# Patient Record
Sex: MALE | NOT HISPANIC OR LATINO | URBAN - METROPOLITAN AREA
[De-identification: names, ages, dates, MRNs, and addresses within clinical notes are randomized per-mention and may not be internally consistent; named-entity substitution may affect disease eponyms.]

---

## 2023-05-02 ENCOUNTER — LAB (OUTPATIENT)
Dept: LAB | Facility: LAB | Age: 33
End: 2023-05-02
Payer: COMMERCIAL

## 2023-05-02 ENCOUNTER — TELEMEDICINE (OUTPATIENT)
Dept: PRIMARY CARE | Facility: CLINIC | Age: 33
End: 2023-05-02
Payer: COMMERCIAL

## 2023-05-02 DIAGNOSIS — B99.9 RECURRENT INFECTIONS: Primary | ICD-10-CM

## 2023-05-02 DIAGNOSIS — B99.9 RECURRENT INFECTIONS: ICD-10-CM

## 2023-05-02 PROBLEM — F52.8 HYPERSEXUALITY: Status: ACTIVE | Noted: 2023-05-02

## 2023-05-02 PROBLEM — E55.9 VITAMIN D DEFICIENCY: Status: ACTIVE | Noted: 2023-05-02

## 2023-05-02 PROBLEM — J45.990 ASTHMA, EXERCISE INDUCED (HHS-HCC): Status: ACTIVE | Noted: 2023-05-02

## 2023-05-02 PROBLEM — M25.551 RIGHT HIP PAIN: Status: ACTIVE | Noted: 2018-08-27

## 2023-05-02 PROBLEM — F41.8 DEPRESSION WITH ANXIETY: Status: ACTIVE | Noted: 2023-05-02

## 2023-05-02 PROBLEM — F41.8 SITUATIONAL ANXIETY: Status: ACTIVE | Noted: 2023-05-02

## 2023-05-02 PROBLEM — F10.21 ALCOHOL DEPENDENCE IN REMISSION (MULTI): Status: ACTIVE | Noted: 2023-05-02

## 2023-05-02 LAB
BASOPHILS (10*3/UL) IN BLOOD BY AUTOMATED COUNT: 0.03 X10E9/L (ref 0–0.1)
BASOPHILS/100 LEUKOCYTES IN BLOOD BY AUTOMATED COUNT: 0.3 % (ref 0–2)
CALCIDIOL (25 OH VITAMIN D3) (NG/ML) IN SER/PLAS: 44 NG/ML
EOSINOPHILS (10*3/UL) IN BLOOD BY AUTOMATED COUNT: 0.08 X10E9/L (ref 0–0.7)
EOSINOPHILS/100 LEUKOCYTES IN BLOOD BY AUTOMATED COUNT: 0.8 % (ref 0–6)
ERYTHROCYTE DISTRIBUTION WIDTH (RATIO) BY AUTOMATED COUNT: 12.3 % (ref 11.5–14.5)
ERYTHROCYTE MEAN CORPUSCULAR HEMOGLOBIN CONCENTRATION (G/DL) BY AUTOMATED: 32.4 G/DL (ref 32–36)
ERYTHROCYTE MEAN CORPUSCULAR VOLUME (FL) BY AUTOMATED COUNT: 91 FL (ref 80–100)
ERYTHROCYTES (10*6/UL) IN BLOOD BY AUTOMATED COUNT: 5.21 X10E12/L (ref 4.5–5.9)
HEMATOCRIT (%) IN BLOOD BY AUTOMATED COUNT: 47.2 % (ref 41–52)
HEMOGLOBIN (G/DL) IN BLOOD: 15.3 G/DL (ref 13.5–17.5)
IMMATURE GRANULOCYTES/100 LEUKOCYTES IN BLOOD BY AUTOMATED COUNT: 0.3 % (ref 0–0.9)
LEUKOCYTES (10*3/UL) IN BLOOD BY AUTOMATED COUNT: 9.5 X10E9/L (ref 4.4–11.3)
LYMPHOCYTES (10*3/UL) IN BLOOD BY AUTOMATED COUNT: 2.6 X10E9/L (ref 1.2–4.8)
LYMPHOCYTES/100 LEUKOCYTES IN BLOOD BY AUTOMATED COUNT: 27.3 % (ref 13–44)
MONOCYTES (10*3/UL) IN BLOOD BY AUTOMATED COUNT: 0.44 X10E9/L (ref 0.1–1)
MONOCYTES/100 LEUKOCYTES IN BLOOD BY AUTOMATED COUNT: 4.6 % (ref 2–10)
NEUTROPHILS (10*3/UL) IN BLOOD BY AUTOMATED COUNT: 6.33 X10E9/L (ref 1.2–7.7)
NEUTROPHILS/100 LEUKOCYTES IN BLOOD BY AUTOMATED COUNT: 66.7 % (ref 40–80)
NRBC (PER 100 WBCS) BY AUTOMATED COUNT: 0 /100 WBC (ref 0–0)
PLATELETS (10*3/UL) IN BLOOD AUTOMATED COUNT: 302 X10E9/L (ref 150–450)
THYROTROPIN (MIU/L) IN SER/PLAS BY DETECTION LIMIT <= 0.05 MIU/L: 1.71 MIU/L (ref 0.44–3.98)

## 2023-05-02 PROCEDURE — 85025 COMPLETE CBC W/AUTO DIFF WBC: CPT

## 2023-05-02 PROCEDURE — 86317 IMMUNOASSAY INFECTIOUS AGENT: CPT

## 2023-05-02 PROCEDURE — 86665 EPSTEIN-BARR CAPSID VCA: CPT

## 2023-05-02 PROCEDURE — 80053 COMPREHEN METABOLIC PANEL: CPT

## 2023-05-02 PROCEDURE — 86663 EPSTEIN-BARR ANTIBODY: CPT

## 2023-05-02 PROCEDURE — 99213 OFFICE O/P EST LOW 20 MIN: CPT | Performed by: FAMILY MEDICINE

## 2023-05-02 PROCEDURE — 36415 COLL VENOUS BLD VENIPUNCTURE: CPT

## 2023-05-02 PROCEDURE — 87389 HIV-1 AG W/HIV-1&-2 AB AG IA: CPT

## 2023-05-02 PROCEDURE — 86664 EPSTEIN-BARR NUCLEAR ANTIGEN: CPT

## 2023-05-02 PROCEDURE — 82306 VITAMIN D 25 HYDROXY: CPT

## 2023-05-02 PROCEDURE — 84443 ASSAY THYROID STIM HORMONE: CPT

## 2023-05-02 RX ORDER — LEVALBUTEROL TARTRATE 45 UG/1
1 AEROSOL, METERED ORAL
COMMUNITY
Start: 2016-04-26 | End: 2023-08-28 | Stop reason: SDUPTHER

## 2023-05-02 RX ORDER — BUSPIRONE HYDROCHLORIDE 15 MG/1
15 TABLET ORAL 3 TIMES DAILY
COMMUNITY
Start: 2018-07-25 | End: 2023-08-28 | Stop reason: ALTCHOICE

## 2023-05-02 RX ORDER — PAROXETINE HYDROCHLORIDE 40 MG/1
40 TABLET, FILM COATED ORAL
COMMUNITY
End: 2023-08-21

## 2023-05-02 ASSESSMENT — PATIENT HEALTH QUESTIONNAIRE - PHQ9
2. FEELING DOWN, DEPRESSED OR HOPELESS: NOT AT ALL
1. LITTLE INTEREST OR PLEASURE IN DOING THINGS: NOT AT ALL
SUM OF ALL RESPONSES TO PHQ9 QUESTIONS 1 AND 2: 0

## 2023-05-02 NOTE — PROGRESS NOTES
Subjective   Patient ID: Adriel Moore is a 32 y.o. male who presents for Nasal Congestion.    HPI  In early January 2023, Laron developed symptoms of sore throat, nasal congestion, and body aches. Despite a negative home COVID test, the patient sought medical care at a local [facility] clinic, where he was diagnosed with a sinus infection and prescribed antibiotics, resulting in symptom relief.    However, the patient reported experiencing recurrent symptoms every 2 to 3 weeks since the initial presentation, which typically last for 3 to 4 days without treatment. These symptoms include body aches, chills, postnasal drip, and fatigue. The patient denies any fever, nausea, or vomiting but experiences tiredness.    During the most recent episode, which occurred 5 days ago, the patient reported feeling tired and occasionally coughing. The cough is nonproductive, and the patient denies any difficulty breathing, such as wheezing or shortness of breath.    The patient has a history of COVID-19, having been diagnosed with the virus twice, first in Christmas 2020 and then again in August 2022. 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    Review of Systems  All pertinent positive symptoms are included in the history of present illness.    All other systems have been reviewed and are negative and noncontributory to this patient's current ailments.    No Known Allergies    Immunization History   Administered Date(s) Administered    Hep A, Unspecified 06/10/2005    Influenza, Unspecified 01/05/2010, 12/05/2012    Influenza, injectable, MDCK, preservative free, quadrivalent 09/28/2020, 01/28/2023    Influenza, injectable, quadrivalent 01/28/2023    Moderna SARS-CoV-2 Vaccination 02/05/2021, 03/05/2021, 10/29/2021    Tdap 06/29/2021    Typhoid, oral 06/10/2005       Objective   Visit Vitals  Smoking Status  Never       Physical Exam  CONSTITUTIONAL - well nourished, well developed, looks like stated age, in no acute distress, not ill-appearing, and not tired appearing  SKIN - normal skin color and pigmentation  EYES - normal external exam  LUNGS - breathing comfortably, no dyspnea  EXTREMITIES - no deformities noticeable  NEUROLOGICAL - oriented and no focal signs  PSYCHIATRIC - alert, pleasant and cordial, age-appropriate, not anxious or depressed appearing    Assessment/Plan   Problem List Items Addressed This Visit       Recurrent infections - Primary     We talked about obtaining some baseline blood work to determine if your immune system is working properly  If we are not able to find a source, then we certainly can consider immunology referral  We will notify of test results once available and make treatment recommendations accordingly         Relevant Orders    Strep Pneumo IgG Ab 23 Serotypes    HIV 1/2 Antigen/Antibody Screen with Reflex to Confirmation    Comprehensive Metabolic Panel    Vitamin D, Total    Thyroid Stimulating Hormone    Sharyn-Barr Virus Antibody Panel    CBC and Auto Differential

## 2023-05-02 NOTE — ASSESSMENT & PLAN NOTE
We talked about obtaining some baseline blood work to determine if your immune system is working properly  If we are not able to find a source, then we certainly can consider immunology referral  We will notify of test results once available and make treatment recommendations accordingly

## 2023-05-03 LAB
ALANINE AMINOTRANSFERASE (SGPT) (U/L) IN SER/PLAS: 10 U/L (ref 10–52)
ALBUMIN (G/DL) IN SER/PLAS: 4.5 G/DL (ref 3.4–5)
ALKALINE PHOSPHATASE (U/L) IN SER/PLAS: 70 U/L (ref 33–120)
ANION GAP IN SER/PLAS: 13 MMOL/L (ref 10–20)
ASPARTATE AMINOTRANSFERASE (SGOT) (U/L) IN SER/PLAS: 13 U/L (ref 9–39)
BILIRUBIN TOTAL (MG/DL) IN SER/PLAS: 0.4 MG/DL (ref 0–1.2)
CALCIUM (MG/DL) IN SER/PLAS: 9.7 MG/DL (ref 8.6–10.6)
CARBON DIOXIDE, TOTAL (MMOL/L) IN SER/PLAS: 29 MMOL/L (ref 21–32)
CHLORIDE (MMOL/L) IN SER/PLAS: 101 MMOL/L (ref 98–107)
CREATININE (MG/DL) IN SER/PLAS: 1.02 MG/DL (ref 0.5–1.3)
EBV INTERPRETATION: ABNORMAL
EPSTEIN-BARR VCA IGG: POSITIVE
EPSTEIN-BARR VCA IGM: NEGATIVE
EPSTEIN-BARR VIRUS EARLY ANTIGEN ANTIBODY, IGG: NEGATIVE
EPSTIEN-BARR NUCLEAR ANTIGEN AB: POSITIVE
GFR MALE: >90 ML/MIN/1.73M2
GLUCOSE (MG/DL) IN SER/PLAS: 95 MG/DL (ref 74–99)
HIV 1/ 2 AG/AB SCREEN: NONREACTIVE
POTASSIUM (MMOL/L) IN SER/PLAS: 4.9 MMOL/L (ref 3.5–5.3)
PROTEIN TOTAL: 7.6 G/DL (ref 6.4–8.2)
SODIUM (MMOL/L) IN SER/PLAS: 138 MMOL/L (ref 136–145)
UREA NITROGEN (MG/DL) IN SER/PLAS: 13 MG/DL (ref 6–23)

## 2023-05-03 NOTE — RESULT ENCOUNTER NOTE
Sharyn-Barr virus which is the virus linked with mononucleosis indicates previous exposure but not active    Sugar, kidney, liver, electrolytes, complete blood count, thyroid, HIV, vitamin D are all perfect    I am still waiting for the Streptococcus 1 which take several days to come back

## 2023-05-05 LAB
PNEUMO SEROTYPE INTERPRETATION: NORMAL
SEROTYPE 1, VIRC: 0.25 UG/ML
SEROTYPE 10A(34), VIRC: 4.33 UG/ML
SEROTYPE 11A(43), VIRC: 0.41 UG/ML
SEROTYPE 12F, VIRC: 0.15 UG/ML
SEROTYPE 14, VIRC: 0.15 UG/ML
SEROTYPE 15B(54), VIRC: 4.38 UG/ML
SEROTYPE 17F, VIRC: 3.53 UG/ML
SEROTYPE 18C(56), VIRC: 1.82 UG/ML
SEROTYPE 19A(57), VIRC: NORMAL UG/ML
SEROTYPE 19F, VIRC: 0.89 UG/ML
SEROTYPE 2, VIRC: 1.11 UG/ML
SEROTYPE 20, VIRC: 2.6 UG/ML
SEROTYPE 22F, VIRC: 0.84 UG/ML
SEROTYPE 23F, VIRC: 0.69 UG/ML
SEROTYPE 3, VIRC: 0.05 UG/ML
SEROTYPE 33F(70), VIRC: 0.53 UG/ML
SEROTYPE 4, VIRC: 0.12 UG/ML
SEROTYPE 5, VIRC: 0.36 UG/ML
SEROTYPE 6B(26), VIRC: 0.29 UG/ML
SEROTYPE 7F(51), VIRC: 0.58 UG/ML
SEROTYPE 8, VIRC: 5.53 UG/ML
SEROTYPE 9N, VIRC: 0.16 UG/ML
SEROTYPE 9V(68), VIRC: 0.12 UG/ML

## 2023-05-07 NOTE — RESULT ENCOUNTER NOTE
Immune blood test that we did indicates that of the 23 bacteria serotypes that we did your body responded to only 3 of them, so I would highly recommend receiving the Pneumovax 23 and then repeating blood work in a month

## 2023-08-19 DIAGNOSIS — F41.8 OTHER SPECIFIED ANXIETY DISORDERS: ICD-10-CM

## 2023-08-21 RX ORDER — PAROXETINE HYDROCHLORIDE 40 MG/1
40 TABLET, FILM COATED ORAL
Qty: 30 TABLET | Refills: 0 | Status: SHIPPED | OUTPATIENT
Start: 2023-08-21 | End: 2023-08-28 | Stop reason: SDUPTHER

## 2023-08-28 ENCOUNTER — LAB (OUTPATIENT)
Dept: LAB | Facility: LAB | Age: 33
End: 2023-08-28
Payer: COMMERCIAL

## 2023-08-28 ENCOUNTER — OFFICE VISIT (OUTPATIENT)
Dept: PRIMARY CARE | Facility: CLINIC | Age: 33
End: 2023-08-28
Payer: COMMERCIAL

## 2023-08-28 VITALS
WEIGHT: 168 LBS | BODY MASS INDEX: 25.46 KG/M2 | HEIGHT: 68 IN | DIASTOLIC BLOOD PRESSURE: 80 MMHG | HEART RATE: 74 BPM | SYSTOLIC BLOOD PRESSURE: 124 MMHG

## 2023-08-28 DIAGNOSIS — F41.8 DEPRESSION WITH ANXIETY: ICD-10-CM

## 2023-08-28 DIAGNOSIS — Z00.00 ANNUAL PHYSICAL EXAM: Primary | ICD-10-CM

## 2023-08-28 DIAGNOSIS — Z00.00 ANNUAL PHYSICAL EXAM: ICD-10-CM

## 2023-08-28 DIAGNOSIS — Z11.4 ENCOUNTER FOR SCREENING FOR HIV: ICD-10-CM

## 2023-08-28 DIAGNOSIS — Z11.59 NEED FOR HEPATITIS C SCREENING TEST: ICD-10-CM

## 2023-08-28 DIAGNOSIS — J45.990 ASTHMA, EXERCISE INDUCED (HHS-HCC): ICD-10-CM

## 2023-08-28 PROBLEM — F10.21 ALCOHOL DEPENDENCE IN REMISSION (MULTI): Status: RESOLVED | Noted: 2023-05-02 | Resolved: 2023-08-28

## 2023-08-28 PROBLEM — M25.551 RIGHT HIP PAIN: Status: RESOLVED | Noted: 2018-08-27 | Resolved: 2023-08-28

## 2023-08-28 PROBLEM — B99.9 RECURRENT INFECTIONS: Status: RESOLVED | Noted: 2023-05-02 | Resolved: 2023-08-28

## 2023-08-28 PROBLEM — F52.8 HYPERSEXUALITY: Status: RESOLVED | Noted: 2023-05-02 | Resolved: 2023-08-28

## 2023-08-28 LAB
ALANINE AMINOTRANSFERASE (SGPT) (U/L) IN SER/PLAS: 23 U/L (ref 10–52)
ALBUMIN (G/DL) IN SER/PLAS: 4.6 G/DL (ref 3.4–5)
ALKALINE PHOSPHATASE (U/L) IN SER/PLAS: 50 U/L (ref 33–120)
ANION GAP IN SER/PLAS: 12 MMOL/L (ref 10–20)
ASPARTATE AMINOTRANSFERASE (SGOT) (U/L) IN SER/PLAS: 32 U/L (ref 9–39)
BASOPHILS (10*3/UL) IN BLOOD BY AUTOMATED COUNT: 0.04 X10E9/L (ref 0–0.1)
BASOPHILS/100 LEUKOCYTES IN BLOOD BY AUTOMATED COUNT: 0.5 % (ref 0–2)
BILIRUBIN TOTAL (MG/DL) IN SER/PLAS: 0.6 MG/DL (ref 0–1.2)
CALCIUM (MG/DL) IN SER/PLAS: 9.9 MG/DL (ref 8.6–10.3)
CARBON DIOXIDE, TOTAL (MMOL/L) IN SER/PLAS: 29 MMOL/L (ref 21–32)
CHLORIDE (MMOL/L) IN SER/PLAS: 100 MMOL/L (ref 98–107)
CHOLESTEROL (MG/DL) IN SER/PLAS: 161 MG/DL (ref 0–199)
CHOLESTEROL IN HDL (MG/DL) IN SER/PLAS: 64.6 MG/DL
CHOLESTEROL/HDL RATIO: 2.5
CREATININE (MG/DL) IN SER/PLAS: 1.03 MG/DL (ref 0.5–1.3)
EOSINOPHILS (10*3/UL) IN BLOOD BY AUTOMATED COUNT: 0.1 X10E9/L (ref 0–0.7)
EOSINOPHILS/100 LEUKOCYTES IN BLOOD BY AUTOMATED COUNT: 1.3 % (ref 0–6)
ERYTHROCYTE DISTRIBUTION WIDTH (RATIO) BY AUTOMATED COUNT: 13.3 % (ref 11.5–14.5)
ERYTHROCYTE MEAN CORPUSCULAR HEMOGLOBIN CONCENTRATION (G/DL) BY AUTOMATED: 31.4 G/DL (ref 32–36)
ERYTHROCYTE MEAN CORPUSCULAR VOLUME (FL) BY AUTOMATED COUNT: 93 FL (ref 80–100)
ERYTHROCYTES (10*6/UL) IN BLOOD BY AUTOMATED COUNT: 5.26 X10E12/L (ref 4.5–5.9)
GFR MALE: >90 ML/MIN/1.73M2
GLUCOSE (MG/DL) IN SER/PLAS: 103 MG/DL (ref 74–99)
HEMATOCRIT (%) IN BLOOD BY AUTOMATED COUNT: 49.1 % (ref 41–52)
HEMOGLOBIN (G/DL) IN BLOOD: 15.4 G/DL (ref 13.5–17.5)
HEPATITIS C VIRUS AB PRESENCE IN SERUM: NONREACTIVE
HIV 1/ 2 AG/AB SCREEN: NONREACTIVE
IMMATURE GRANULOCYTES/100 LEUKOCYTES IN BLOOD BY AUTOMATED COUNT: 0.4 % (ref 0–0.9)
LDL: 86 MG/DL (ref 0–99)
LEUKOCYTES (10*3/UL) IN BLOOD BY AUTOMATED COUNT: 7.8 X10E9/L (ref 4.4–11.3)
LYMPHOCYTES (10*3/UL) IN BLOOD BY AUTOMATED COUNT: 2.15 X10E9/L (ref 1.2–4.8)
LYMPHOCYTES/100 LEUKOCYTES IN BLOOD BY AUTOMATED COUNT: 27.7 % (ref 13–44)
MONOCYTES (10*3/UL) IN BLOOD BY AUTOMATED COUNT: 0.5 X10E9/L (ref 0.1–1)
MONOCYTES/100 LEUKOCYTES IN BLOOD BY AUTOMATED COUNT: 6.5 % (ref 2–10)
NEUTROPHILS (10*3/UL) IN BLOOD BY AUTOMATED COUNT: 4.93 X10E9/L (ref 1.2–7.7)
NEUTROPHILS/100 LEUKOCYTES IN BLOOD BY AUTOMATED COUNT: 63.6 % (ref 40–80)
NRBC (PER 100 WBCS) BY AUTOMATED COUNT: 0.3 /100 WBC (ref 0–0)
PLATELETS (10*3/UL) IN BLOOD AUTOMATED COUNT: 268 X10E9/L (ref 150–450)
POC APPEARANCE, URINE: CLEAR
POC BILIRUBIN, URINE: NEGATIVE
POC BLOOD, URINE: NEGATIVE
POC COLOR, URINE: YELLOW
POC GLUCOSE, URINE: NEGATIVE MG/DL
POC KETONES, URINE: NEGATIVE MG/DL
POC LEUKOCYTES, URINE: NEGATIVE
POC NITRITE,URINE: NEGATIVE
POC PH, URINE: 7 PH
POC PROTEIN, URINE: NEGATIVE MG/DL
POC SPECIFIC GRAVITY, URINE: 1.01
POC UROBILINOGEN, URINE: 0.2 EU/DL
POTASSIUM (MMOL/L) IN SER/PLAS: 4.7 MMOL/L (ref 3.5–5.3)
PROTEIN TOTAL: 7.6 G/DL (ref 6.4–8.2)
SODIUM (MMOL/L) IN SER/PLAS: 136 MMOL/L (ref 136–145)
THYROTROPIN (MIU/L) IN SER/PLAS BY DETECTION LIMIT <= 0.05 MIU/L: 2.57 MIU/L (ref 0.44–3.98)
TRIGLYCERIDE (MG/DL) IN SER/PLAS: 50 MG/DL (ref 0–149)
UREA NITROGEN (MG/DL) IN SER/PLAS: 18 MG/DL (ref 6–23)
VLDL: 10 MG/DL (ref 0–40)

## 2023-08-28 PROCEDURE — 81002 URINALYSIS NONAUTO W/O SCOPE: CPT | Performed by: FAMILY MEDICINE

## 2023-08-28 PROCEDURE — 85025 COMPLETE CBC W/AUTO DIFF WBC: CPT

## 2023-08-28 PROCEDURE — 36415 COLL VENOUS BLD VENIPUNCTURE: CPT

## 2023-08-28 PROCEDURE — 84443 ASSAY THYROID STIM HORMONE: CPT

## 2023-08-28 PROCEDURE — 93000 ELECTROCARDIOGRAM COMPLETE: CPT | Performed by: FAMILY MEDICINE

## 2023-08-28 PROCEDURE — 80053 COMPREHEN METABOLIC PANEL: CPT

## 2023-08-28 PROCEDURE — 86803 HEPATITIS C AB TEST: CPT

## 2023-08-28 PROCEDURE — 80061 LIPID PANEL: CPT

## 2023-08-28 PROCEDURE — 99395 PREV VISIT EST AGE 18-39: CPT | Performed by: FAMILY MEDICINE

## 2023-08-28 PROCEDURE — 87389 HIV-1 AG W/HIV-1&-2 AB AG IA: CPT

## 2023-08-28 PROCEDURE — 4004F PT TOBACCO SCREEN RCVD TLK: CPT | Performed by: FAMILY MEDICINE

## 2023-08-28 RX ORDER — PAROXETINE HYDROCHLORIDE 40 MG/1
40 TABLET, FILM COATED ORAL
Qty: 90 TABLET | Refills: 1 | Status: SHIPPED | OUTPATIENT
Start: 2023-08-28 | End: 2024-04-01

## 2023-08-28 RX ORDER — LEVALBUTEROL TARTRATE 45 UG/1
1-2 AEROSOL, METERED ORAL EVERY 4 HOURS PRN
Qty: 15 G | Refills: 5 | Status: SHIPPED | OUTPATIENT
Start: 2023-08-28 | End: 2023-11-22 | Stop reason: SDUPTHER

## 2023-08-28 ASSESSMENT — PROMIS GLOBAL HEALTH SCALE
RATE_MENTAL_HEALTH: FAIR
RATE_AVERAGE_FATIGUE: MODERATE
RATE_AVERAGE_PAIN: 2
CARRYOUT_SOCIAL_ACTIVITIES: GOOD
RATE_PHYSICAL_HEALTH: VERY GOOD
CARRYOUT_PHYSICAL_ACTIVITIES: COMPLETELY
RATE_QUALITY_OF_LIFE: GOOD
RATE_GENERAL_HEALTH: GOOD
RATE_SOCIAL_SATISFACTION: VERY GOOD
EMOTIONAL_PROBLEMS: SOMETIMES

## 2023-08-28 ASSESSMENT — PATIENT HEALTH QUESTIONNAIRE - PHQ9
1. LITTLE INTEREST OR PLEASURE IN DOING THINGS: NOT AT ALL
2. FEELING DOWN, DEPRESSED OR HOPELESS: NOT AT ALL
SUM OF ALL RESPONSES TO PHQ9 QUESTIONS 1 AND 2: 0

## 2023-08-28 NOTE — PROGRESS NOTES
Subjective   Reason for Visit: Adriel Moore is an 33 y.o. male here for a Annual Exam.     Past Medical, Surgical, and Family History reviewed and updated in chart.    Reviewed all medications by prescribing practitioner or clinical pharmacist (such as prescriptions, OTCs, herbal therapies and supplements) and documented in the medical record.    HPI  1.  Physical exam.  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  Depression and anxiety.  Currently taking Paxil 40 mg daily, tolerates well, requesting refill    3.  Exercise-induced asthma.  Currently controlled, requesting refill on Xopenex  Lost 20 pounds since January, working on trying to get ready for the [place] marathon in October  He feels fantastic, no breathing difficulties    Review of Systems  All pertinent positive symptoms are included in the history of present illness.    All other systems have been reviewed and are negative and noncontributory to this patient's current ailments.    Current Outpatient Medications   Medication Instructions    levalbuterol (Xopenex HFA) 45 mcg/actuation inhaler 1-2 puffs, inhalation, Every 4 hours PRN    PARoxetine (PAXIL) 40 mg, oral, Daily before breakfast     No Known Allergies  Immunization History   Administered Date(s) Administered    Hep A, Unspecified 06/10/2005    Influenza, Unspecified 01/05/2010, 12/05/2012    Influenza, injectable, MDCK, preservative free, quadrivalent 09/28/2020, 01/28/2023    Influenza, injectable, quadrivalent 01/28/2023    Moderna SARS-CoV-2 Vaccination 02/05/2021, 03/05/2021, 10/29/2021    Tdap vaccine, age 10 years and older (BOOSTRIX) 06/29/2021    Typhoid, oral 06/10/2005     Past Surgical History:   Procedure Laterality Date    OTHER SURGICAL HISTORY  10/06/2017    Prior Surgical Procedure Not Done     No family history on file.    Social History     Tobacco Use  "   Smoking status: Every Day     Types: Cigarettes     Passive exposure: Never    Smokeless tobacco: Never   Substance Use Topics    Alcohol use: Not Currently    Drug use: Never     Objective   Visit Vitals  /80   Pulse 74   Ht 1.727 m (5' 8\")   Wt 76.2 kg (168 lb)   BMI 25.54 kg/m²   Smoking Status Every Day   BSA 1.91 m²      Physical Exam  CONSTITUTIONAL - well nourished, well developed, looks like stated age, in no acute distress, not ill-appearing, and not tired appearing  SKIN - normal skin color and pigmentation, normal skin turgor without rash, lesions, or nodules visualized  HEAD - no trauma, normocephalic  EYES - pupils are equal and reactive to light, extraocular muscles are intact, and normal external exam  ENT - R sided cerumen, unable to visualize TM, left TM intact, no signs of infection, canal patent without wax, uvula midline, normal tongue movement and throat normal, no exudate  NECK - supple without rigidity, no neck mass was observed, no thyromegaly or thyroid nodules  CHEST - clear to auscultation, no wheezing, no crackles and no rales, good effort  CARDIAC - bradycardic rate and regular rhythm, no skipped beats, no murmur  ABDOMEN - no organomegaly, soft, nontender, nondistended, normal bowel sounds, no guarding/rebound/rigidity, negative McBurney sign and negative Carrera sign  EXTREMITIES - no edema, no deformities  NEUROLOGICAL - normal gait, normal balance, normal motor, no ataxia, DTRs 3/4 knees equal and symmetrical; alert, oriented and no focal signs  PSYCHIATRIC - alert, pleasant and cordial, age-appropriate  IMMUNOLOGIC - no cervical lymphadenopathy    Assessment/Plan   Problem List Items Addressed This Visit       Asthma, exercise induced    Current Assessment & Plan     Stable, no changes to medication recommended         Relevant Medications    levalbuterol (Xopenex HFA) 45 mcg/actuation inhaler    Depression with anxiety    Current Assessment & Plan     Stable, no changes to " medication recommended         Relevant Medications    PARoxetine (Paxil) 40 mg tablet    Annual physical exam - Primary    Relevant Orders    Lipid Panel    TSH with reflex to Free T4 if abnormal    Comprehensive Metabolic Panel    CBC and Auto Differential    HIV 1/2 Antigen/Antibody Screen with Reflex to Confirmation    Hepatitis C Antibody    POCT UA (nonautomated) manually resulted (Completed)    ECG 12 lead (Completed)     Other Visit Diagnoses       Encounter for screening for HIV        Relevant Orders    HIV 1/2 Antigen/Antibody Screen with Reflex to Confirmation    Need for hepatitis C screening test        Relevant Orders    Hepatitis C Antibody          Patient Care Team:  Kenny Conley DO as PCP - General (Family Medicine)  Kenny Conley DO as PCP - Bakari ALDRIDGE PCP

## 2023-08-28 NOTE — RESULT ENCOUNTER NOTE
Glucose 103 but it has been as high as 110 in the past so stable    Electrolytes, kidney, liver, complete blood cell count, hepatitis C, HIV, thyroid, cholesterol look excellent across-the-board    Congratulations, keep up the great work, good luck at the [place] marathon

## 2023-11-22 ENCOUNTER — OFFICE VISIT (OUTPATIENT)
Dept: PRIMARY CARE | Facility: CLINIC | Age: 33
End: 2023-11-22
Payer: COMMERCIAL

## 2023-11-22 ENCOUNTER — LAB (OUTPATIENT)
Dept: LAB | Facility: LAB | Age: 33
End: 2023-11-22
Payer: COMMERCIAL

## 2023-11-22 VITALS
DIASTOLIC BLOOD PRESSURE: 72 MMHG | HEART RATE: 70 BPM | BODY MASS INDEX: 26.76 KG/M2 | WEIGHT: 176 LBS | SYSTOLIC BLOOD PRESSURE: 120 MMHG

## 2023-11-22 DIAGNOSIS — J45.990 ASTHMA, EXERCISE INDUCED (HHS-HCC): Primary | ICD-10-CM

## 2023-11-22 DIAGNOSIS — Z20.2 POSSIBLE EXPOSURE TO STD: ICD-10-CM

## 2023-11-22 DIAGNOSIS — Z23 INFLUENZA VACCINE NEEDED: ICD-10-CM

## 2023-11-22 DIAGNOSIS — F41.8 SITUATIONAL ANXIETY: ICD-10-CM

## 2023-11-22 PROCEDURE — 87661 TRICHOMONAS VAGINALIS AMPLIF: CPT

## 2023-11-22 PROCEDURE — 36415 COLL VENOUS BLD VENIPUNCTURE: CPT

## 2023-11-22 PROCEDURE — 4004F PT TOBACCO SCREEN RCVD TLK: CPT | Performed by: FAMILY MEDICINE

## 2023-11-22 PROCEDURE — 90471 IMMUNIZATION ADMIN: CPT | Performed by: FAMILY MEDICINE

## 2023-11-22 PROCEDURE — 87389 HIV-1 AG W/HIV-1&-2 AB AG IA: CPT

## 2023-11-22 PROCEDURE — 87800 DETECT AGNT MULT DNA DIREC: CPT

## 2023-11-22 PROCEDURE — 99214 OFFICE O/P EST MOD 30 MIN: CPT | Performed by: FAMILY MEDICINE

## 2023-11-22 PROCEDURE — 86780 TREPONEMA PALLIDUM: CPT

## 2023-11-22 PROCEDURE — 90686 IIV4 VACC NO PRSV 0.5 ML IM: CPT | Performed by: FAMILY MEDICINE

## 2023-11-22 RX ORDER — LEVALBUTEROL TARTRATE 45 UG/1
1-2 AEROSOL, METERED ORAL EVERY 4 HOURS PRN
Qty: 15 G | Refills: 5 | Status: SHIPPED | OUTPATIENT
Start: 2023-11-22
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
## 2023-11-22 NOTE — ASSESSMENT & PLAN NOTE
All questions were answered and you were counseled on immunization(s) in detail and vaccination was provided  Please follow-up in the office on an annual basis for continued health maintenance

## 2023-11-23 LAB
C TRACH RRNA SPEC QL NAA+PROBE: NEGATIVE
HIV 1+2 AB+HIV1 P24 AG SERPL QL IA: NONREACTIVE
N GONORRHOEA DNA SPEC QL PROBE+SIG AMP: NEGATIVE
T PALLIDUM AB SER QL: NONREACTIVE
T VAGINALIS RRNA SPEC QL NAA+PROBE: NEGATIVE

## 2024-03-30 DIAGNOSIS — F41.8 DEPRESSION WITH ANXIETY: ICD-10-CM

## 2024-04-01 RX ORDER — PAROXETINE HYDROCHLORIDE 40 MG/1
40 TABLET, FILM COATED ORAL
Qty: 30 TABLET | Refills: 0 | Status: SHIPPED | OUTPATIENT
Start: 2024-04-01 | End: 2024-04-15 | Stop reason: SDUPTHER

## 2024-04-15 ENCOUNTER — OFFICE VISIT (OUTPATIENT)
Dept: PRIMARY CARE | Facility: CLINIC | Age: 34
End: 2024-04-15
Payer: COMMERCIAL

## 2024-04-15 VITALS
WEIGHT: 175 LBS | DIASTOLIC BLOOD PRESSURE: 70 MMHG | HEIGHT: 68 IN | HEART RATE: 62 BPM | SYSTOLIC BLOOD PRESSURE: 126 MMHG | BODY MASS INDEX: 26.52 KG/M2

## 2024-04-15 DIAGNOSIS — J45.990 ASTHMA, EXERCISE INDUCED (HHS-HCC): Primary | ICD-10-CM

## 2024-04-15 DIAGNOSIS — F41.8 DEPRESSION WITH ANXIETY: ICD-10-CM

## 2024-04-15 PROBLEM — Z23 INFLUENZA VACCINE NEEDED: Status: RESOLVED | Noted: 2023-11-22 | Resolved: 2024-04-15

## 2024-04-15 PROBLEM — Z20.2 POSSIBLE EXPOSURE TO STD: Status: RESOLVED | Noted: 2023-11-22 | Resolved: 2024-04-15

## 2024-04-15 PROCEDURE — 4004F PT TOBACCO SCREEN RCVD TLK: CPT | Performed by: FAMILY MEDICINE

## 2024-04-15 PROCEDURE — 99213 OFFICE O/P EST LOW 20 MIN: CPT | Performed by: FAMILY MEDICINE

## 2024-04-15 RX ORDER — PAROXETINE HYDROCHLORIDE 40 MG/1
40 TABLET, FILM COATED ORAL
Qty: 90 TABLET | Refills: 1 | Status: SHIPPED | OUTPATIENT
Start: 2024-04-15 | End: 2024-10-12

## 2024-04-15 ASSESSMENT — PATIENT HEALTH QUESTIONNAIRE - PHQ9
1. LITTLE INTEREST OR PLEASURE IN DOING THINGS: NOT AT ALL
SUM OF ALL RESPONSES TO PHQ9 QUESTIONS 1 AND 2: 0
2. FEELING DOWN, DEPRESSED OR HOPELESS: NOT AT ALL

## 2024-04-15 NOTE — PROGRESS NOTES
"Subjective   Patient ID: Adriel Moore \"Laron\" is a 33 y.o. male who presents for Anxiety.    Past Medical, Surgical, and Family History reviewed and updated in chart.    Reviewed all medications by prescribing practitioner or clinical pharmacist (such as prescriptions, OTCs, herbal therapies and supplements) and documented in the medical record.    HPI  1.  Depression and anxiety.  Currently taking Paxil 40 mg daily, tolerates well, requesting refill    2.  Exercise-induced asthma.  Currently controlled with as needed Xopenex, uses very rarely, not requesting refills today  He feels fantastic, no breathing difficulties    Review of Systems  All pertinent positive symptoms are included in the history of present illness.    All other systems have been reviewed and are negative and noncontributory to this patient's current ailments.    History reviewed. No pertinent past medical history.  Past Surgical History:   Procedure Laterality Date    OTHER SURGICAL HISTORY  10/06/2017    Prior Surgical Procedure Not Done     Social History     Tobacco Use    Smoking status: Every Day     Types: Cigarettes     Passive exposure: Never    Smokeless tobacco: Never   Substance Use Topics    Alcohol use: Not Currently    Drug use: Never     No family history on file.  Immunization History   Administered Date(s) Administered    Flu vaccine (IIV4), preservative free *Check age/dose* 12/27/2019, 11/22/2023    Flu vaccine, quadrivalent, no egg protein, age 6 month or greater (FLUCELVAX) 09/28/2020, 01/28/2023    Hep A, Unspecified 06/10/2005    Influenza, Unspecified 01/05/2010, 12/05/2012    Influenza, injectable, quadrivalent 01/28/2023    Moderna SARS-CoV-2 Vaccination 02/05/2021, 03/05/2021, 10/29/2021    Tdap vaccine, age 7 year and older (BOOSTRIX, ADACEL) 06/29/2021    Typhoid, oral 06/10/2005     Current Outpatient Medications   Medication Instructions    levalbuterol (Xopenex HFA) 45 mcg/actuation inhaler 1-2 puffs, " "inhalation, Every 4 hours PRN    PARoxetine (PAXIL) 40 mg, oral, Daily before breakfast     No Known Allergies    Objective   Vitals:    04/15/24 1007   BP: 126/70   Pulse: 62   Weight: 79.4 kg (175 lb)   Height: 1.727 m (5' 8\")     Body mass index is 26.61 kg/m².    BP Readings from Last 3 Encounters:   04/15/24 126/70   11/22/23 120/72   08/28/23 124/80      Wt Readings from Last 3 Encounters:   04/15/24 79.4 kg (175 lb)   11/22/23 79.8 kg (176 lb)   08/28/23 76.2 kg (168 lb)        No visits with results within 1 Month(s) from this visit.   Latest known visit with results is:   Lab on 11/22/2023   Component Date Value    Syphilis Total Ab 11/22/2023 Nonreactive     HIV 1/2 Antigen/Antibody* 11/22/2023 Nonreactive      Physical Exam  CONSTITUTIONAL - well nourished, well developed, looks like stated age, in no acute distress, not ill-appearing, and not tired appearing  SKIN - normal skin color and pigmentation, normal skin turgor without rash, lesions, or nodules visualized  HEAD - no trauma, normocephalic  CHEST - clear to auscultation, no wheezing, no crackles and no rales, good effort  CARDIAC - regular rate and regular rhythm, no skipped beats, no murmur  EXTREMITIES - no edema, no deformities  NEUROLOGICAL - normal gait, normal balance, normal motor  PSYCHIATRIC - alert, pleasant and cordial, age-appropriate     Assessment/Plan   Problem List Items Addressed This Visit       Asthma, exercise induced ([facility]) - Primary     Well controlled with current as needed medication  Does not need refills today         Depression with anxiety     Stable, no changes  Refill sent to pharmacy         Relevant Medications    PARoxetine (Paxil) 40 mg tablet     "

## 2024-10-25 DIAGNOSIS — F41.8 DEPRESSION WITH ANXIETY: ICD-10-CM

## 2024-10-29 RX ORDER — PAROXETINE HYDROCHLORIDE 40 MG/1
40 TABLET, FILM COATED ORAL
Qty: 90 TABLET | Refills: 1 | OUTPATIENT
Start: 2024-10-29 | End: 2025-04-27

## 2024-11-04 RX ORDER — PAROXETINE HYDROCHLORIDE 40 MG/1
40 TABLET, FILM COATED ORAL
Qty: 30 TABLET | Refills: 0 | Status: SHIPPED | OUTPATIENT
Start: 2024-11-04 | End: 2024-12-04

## 2024-11-12 ENCOUNTER — LAB (OUTPATIENT)
Dept: LAB | Facility: LAB | Age: 34
End: 2024-11-12
Payer: COMMERCIAL

## 2024-11-12 ENCOUNTER — APPOINTMENT (OUTPATIENT)
Dept: PRIMARY CARE | Facility: CLINIC | Age: 34
End: 2024-11-12
Payer: COMMERCIAL

## 2024-11-12 VITALS
SYSTOLIC BLOOD PRESSURE: 132 MMHG | BODY MASS INDEX: 27.28 KG/M2 | HEART RATE: 63 BPM | WEIGHT: 180 LBS | DIASTOLIC BLOOD PRESSURE: 84 MMHG | HEIGHT: 68 IN

## 2024-11-12 DIAGNOSIS — Z00.00 ANNUAL PHYSICAL EXAM: Primary | ICD-10-CM

## 2024-11-12 DIAGNOSIS — J45.990 ASTHMA, EXERCISE INDUCED (HHS-HCC): ICD-10-CM

## 2024-11-12 DIAGNOSIS — F41.8 DEPRESSION WITH ANXIETY: ICD-10-CM

## 2024-11-12 DIAGNOSIS — Z00.00 ANNUAL PHYSICAL EXAM: ICD-10-CM

## 2024-11-12 LAB
ALBUMIN SERPL BCP-MCNC: 4.6 G/DL (ref 3.4–5)
ALP SERPL-CCNC: 51 U/L (ref 33–120)
ALT SERPL W P-5'-P-CCNC: 10 U/L (ref 10–52)
ANION GAP SERPL CALC-SCNC: 12 MMOL/L (ref 10–20)
AST SERPL W P-5'-P-CCNC: 15 U/L (ref 9–39)
BASOPHILS # BLD AUTO: 0.03 X10*3/UL (ref 0–0.1)
BASOPHILS NFR BLD AUTO: 0.5 %
BILIRUB SERPL-MCNC: 0.8 MG/DL (ref 0–1.2)
BUN SERPL-MCNC: 12 MG/DL (ref 6–23)
CALCIUM SERPL-MCNC: 9.4 MG/DL (ref 8.6–10.3)
CHLORIDE SERPL-SCNC: 101 MMOL/L (ref 98–107)
CHOLEST SERPL-MCNC: 183 MG/DL (ref 0–199)
CHOLESTEROL/HDL RATIO: 2.8
CO2 SERPL-SCNC: 30 MMOL/L (ref 21–32)
CREAT SERPL-MCNC: 1.03 MG/DL (ref 0.5–1.3)
EGFRCR SERPLBLD CKD-EPI 2021: >90 ML/MIN/1.73M*2
EOSINOPHIL # BLD AUTO: 0.11 X10*3/UL (ref 0–0.7)
EOSINOPHIL NFR BLD AUTO: 1.7 %
ERYTHROCYTE [DISTWIDTH] IN BLOOD BY AUTOMATED COUNT: 12.5 % (ref 11.5–14.5)
GLUCOSE SERPL-MCNC: 101 MG/DL (ref 74–99)
HCT VFR BLD AUTO: 46.3 % (ref 41–52)
HDLC SERPL-MCNC: 65.8 MG/DL
HGB BLD-MCNC: 15.4 G/DL (ref 13.5–17.5)
IMM GRANULOCYTES # BLD AUTO: 0.01 X10*3/UL (ref 0–0.7)
IMM GRANULOCYTES NFR BLD AUTO: 0.2 % (ref 0–0.9)
LDLC SERPL CALC-MCNC: 101 MG/DL
LYMPHOCYTES # BLD AUTO: 2.04 X10*3/UL (ref 1.2–4.8)
LYMPHOCYTES NFR BLD AUTO: 32.2 %
MCH RBC QN AUTO: 29.7 PG (ref 26–34)
MCHC RBC AUTO-ENTMCNC: 33.3 G/DL (ref 32–36)
MCV RBC AUTO: 89 FL (ref 80–100)
MONOCYTES # BLD AUTO: 0.37 X10*3/UL (ref 0.1–1)
MONOCYTES NFR BLD AUTO: 5.8 %
NEUTROPHILS # BLD AUTO: 3.78 X10*3/UL (ref 1.2–7.7)
NEUTROPHILS NFR BLD AUTO: 59.6 %
NON HDL CHOLESTEROL: 117 MG/DL (ref 0–149)
NRBC BLD-RTO: 0 /100 WBCS (ref 0–0)
PLATELET # BLD AUTO: 244 X10*3/UL (ref 150–450)
POC APPEARANCE, URINE: CLEAR
POC BILIRUBIN, URINE: NEGATIVE
POC BLOOD, URINE: NEGATIVE
POC COLOR, URINE: YELLOW
POC GLUCOSE, URINE: NEGATIVE MG/DL
POC KETONES, URINE: NEGATIVE MG/DL
POC LEUKOCYTES, URINE: NEGATIVE
POC NITRITE,URINE: NEGATIVE
POC PH, URINE: 6 PH
POC PROTEIN, URINE: NEGATIVE MG/DL
POC SPECIFIC GRAVITY, URINE: >=1.03
POC UROBILINOGEN, URINE: 0.2 EU/DL
POTASSIUM SERPL-SCNC: 4.5 MMOL/L (ref 3.5–5.3)
PROT SERPL-MCNC: 7.1 G/DL (ref 6.4–8.2)
RBC # BLD AUTO: 5.18 X10*6/UL (ref 4.5–5.9)
SODIUM SERPL-SCNC: 138 MMOL/L (ref 136–145)
TRIGL SERPL-MCNC: 80 MG/DL (ref 0–149)
TSH SERPL-ACNC: 1.93 MIU/L (ref 0.44–3.98)
VLDL: 16 MG/DL (ref 0–40)
WBC # BLD AUTO: 6.3 X10*3/UL (ref 4.4–11.3)

## 2024-11-12 PROCEDURE — 90471 IMMUNIZATION ADMIN: CPT | Performed by: FAMILY MEDICINE

## 2024-11-12 PROCEDURE — 4004F PT TOBACCO SCREEN RCVD TLK: CPT | Performed by: FAMILY MEDICINE

## 2024-11-12 PROCEDURE — 90656 IIV3 VACC NO PRSV 0.5 ML IM: CPT | Performed by: FAMILY MEDICINE

## 2024-11-12 PROCEDURE — 80061 LIPID PANEL: CPT

## 2024-11-12 PROCEDURE — 36415 COLL VENOUS BLD VENIPUNCTURE: CPT

## 2024-11-12 PROCEDURE — 99214 OFFICE O/P EST MOD 30 MIN: CPT | Performed by: FAMILY MEDICINE

## 2024-11-12 PROCEDURE — 80053 COMPREHEN METABOLIC PANEL: CPT

## 2024-11-12 PROCEDURE — 93000 ELECTROCARDIOGRAM COMPLETE: CPT | Performed by: FAMILY MEDICINE

## 2024-11-12 PROCEDURE — 81002 URINALYSIS NONAUTO W/O SCOPE: CPT | Performed by: FAMILY MEDICINE

## 2024-11-12 PROCEDURE — 99395 PREV VISIT EST AGE 18-39: CPT | Performed by: FAMILY MEDICINE

## 2024-11-12 PROCEDURE — 84443 ASSAY THYROID STIM HORMONE: CPT

## 2024-11-12 PROCEDURE — 85025 COMPLETE CBC W/AUTO DIFF WBC: CPT

## 2024-11-12 PROCEDURE — 3008F BODY MASS INDEX DOCD: CPT | Performed by: FAMILY MEDICINE

## 2024-11-12 RX ORDER — LEVALBUTEROL TARTRATE 45 UG/1
1-2 AEROSOL, METERED ORAL EVERY 4 HOURS PRN
Qty: 15 G | Refills: 5 | Status: SHIPPED | OUTPATIENT
Start: 2024-11-12

## 2024-11-12 RX ORDER — PAROXETINE HYDROCHLORIDE 40 MG/1
40 TABLET, FILM COATED ORAL
Qty: 90 TABLET | Refills: 1 | Status: SHIPPED | OUTPATIENT
Start: 2024-11-12 | End: 2025-05-11

## 2024-11-12 NOTE — RESULT ENCOUNTER NOTE
Complete blood cell count, thyroid, kidney, liver, electrolytes, cholesterol normal  Glucose 101 previous 103 which is considered prediabetic.  Generally we see this data point, we asked patients to try to decrease carbohydrates in the diet and continue to exercise as you have been doing    Again, good luck in the [place]

## 2024-11-12 NOTE — PROGRESS NOTES
"Subjective   Patient ID: Adriel Moore \"Laron\" is a 34 y.o. male who presents for Annual Exam.    Past Medical, Surgical, and Family History reviewed and updated in chart.    Reviewed all medications by prescribing practitioner or clinical pharmacist (such as prescriptions, OTCs, herbal therapies and supplements) and documented in the medical record.    HPI  1. Physical exam:    Laron reports feeling fantastic and is currently sober. 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 Immunizations include a Tdap vaccine from 2021, and he will receive a flu shot today.     He quit smoking cigarettes in January but has started using a vape. He is currently training for the [place] Marathon.    2. Depression and anxiety:    Laron is taking Paxil 40 mg daily, which he tolerates well, and is requesting a refill. He notes that missing a dose results in headaches and difficulty sleeping.    3. Exercise-induced asthma:    The condition is currently controlled. Laron is requesting a refill on Xopenex, which he uses only during the fall season while running. He reports feeling fantastic and has not experienced any breathing difficulties.    Review of Systems  All pertinent positive symptoms are included in the history of present illness.    All other systems have been reviewed and are negative and noncontributory to this patient's current ailments.    History reviewed. No pertinent past medical history.  Past Surgical History:   Procedure Laterality Date    OTHER SURGICAL HISTORY  10/06/2017    Prior Surgical Procedure Not Done     Social History     Tobacco Use    Smoking status: Every Day     Types: Cigarettes     Passive exposure: Never    Smokeless tobacco: Never   Substance Use Topics    Alcohol use: Not Currently    Drug use: Never     No family history on file.  Immunization History   Administered Date(s) Administered    Flu vaccine (IIV4), preservative free *Check age/dose* 12/27/2019, 11/22/2023    Flu " "vaccine, quadrivalent, no egg protein, age 6 month or greater (FLUCELVAX) 09/28/2020, 01/28/2023    Flu vaccine, trivalent, preservative free, age 6 months and greater (Fluarix/Fluzone/Flulaval) 11/12/2024    Hep A, Unspecified 06/10/2005    Influenza, Unspecified 01/05/2010, 12/05/2012    Influenza, injectable, quadrivalent 01/28/2023    Moderna SARS-CoV-2 Vaccination 02/05/2021, 03/05/2021, 10/29/2021    Tdap vaccine, age 7 year and older (BOOSTRIX, ADACEL) 06/29/2021    Typhoid, oral 06/10/2005     Current Outpatient Medications   Medication Instructions    levalbuterol (Xopenex HFA) 45 mcg/actuation inhaler 1-2 puffs, inhalation, Every 4 hours PRN    PARoxetine (PAXIL) 40 mg, oral, Daily before breakfast     No Known Allergies    Objective   Vitals:    11/12/24 1037   BP: 132/84   Pulse: 63   Weight: 81.6 kg (180 lb)   Height: 1.727 m (5' 8\")     Body mass index is 27.37 kg/m².    BP Readings from Last 3 Encounters:   11/12/24 132/84   04/15/24 126/70   11/22/23 120/72      Wt Readings from Last 3 Encounters:   11/12/24 81.6 kg (180 lb)   04/15/24 79.4 kg (175 lb)   11/22/23 79.8 kg (176 lb)      Physical Exam  CONSTITUTIONAL - well nourished, well developed, looks like stated age, in no acute distress, not ill-appearing, and not tired appearing  SKIN - normal skin color and pigmentation, normal skin turgor without rash, lesions, or nodules visualized  HEAD - no trauma, normocephalic  EYES - pupils are equal and reactive to light, extraocular muscles are intact, and normal external exam  CHEST - clear to auscultation, no wheezing, no crackles and no rales, good effort  CARDIAC - regular rate and regular rhythm, no skipped beats, no murmur  ABDOMEN - no organomegaly, soft, nontender, nondistended, normal bowel sounds, no guarding/rebound/rigidity, negative McBurney sign and negative Carrera sign  EXTREMITIES - no obvious or evident edema, no obvious or evident deformities  NEUROLOGICAL - normal gait, normal " balance, normal motor, no ataxia, DTRs equal and symmetrical; alert, oriented and no focal signs  PSYCHIATRIC - alert, pleasant and cordial, age-appropriate    Assessment/Plan   Problem List Items Addressed This Visit       Asthma, exercise induced ([facility])     Well controlled with current as needed medication.  Refill sent to pharmacy today.           Relevant Medications    levalbuterol (Xopenex HFA) 45 mcg/actuation inhaler    Depression with anxiety     Stable, no changes to medication recommended.  Refill sent to pharmacy         Relevant Medications    PARoxetine (Paxil) 40 mg tablet    Annual physical exam - Primary     Complete history and physical examination was performed  EKG reveals sinus rhythm without acute changes  We will notify of test results once available         Relevant Orders    Lipid Panel    TSH with reflex to Free T4 if abnormal    Comprehensive Metabolic Panel    CBC and Auto Differential    POCT UA (nonautomated) manually resulted (Completed)    ECG 12 Lead (Completed)

## 2025-05-30 DIAGNOSIS — F41.8 DEPRESSION WITH ANXIETY: ICD-10-CM

## 2025-05-30 RX ORDER — PAROXETINE 40 MG/1
40 TABLET, FILM COATED ORAL
Qty: 90 TABLET | Refills: 1 | OUTPATIENT
Start: 2025-05-30 | End: 2025-11-26

## 2025-06-03 RX ORDER — PAROXETINE 40 MG/1
40 TABLET, FILM COATED ORAL
Qty: 30 TABLET | Refills: 0 | Status: SHIPPED | OUTPATIENT
Start: 2025-06-03 | End: 2025-07-03

## 2025-06-29 DIAGNOSIS — F41.8 DEPRESSION WITH ANXIETY: ICD-10-CM

## 2025-06-30 RX ORDER — PAROXETINE 40 MG/1
40 TABLET, FILM COATED ORAL
Qty: 30 TABLET | Refills: 0 | OUTPATIENT
Start: 2025-06-30 | End: 2025-07-30

## 2025-07-02 RX ORDER — PAROXETINE 40 MG/1
40 TABLET, FILM COATED ORAL
Qty: 8 TABLET | Refills: 0 | Status: SHIPPED | OUTPATIENT
Start: 2025-07-02 | End: 2025-07-10

## 2025-07-17 ENCOUNTER — APPOINTMENT (OUTPATIENT)
Dept: PRIMARY CARE | Facility: CLINIC | Age: 35
End: 2025-07-17
Payer: COMMERCIAL

## 2025-07-17 VITALS
WEIGHT: 178 LBS | SYSTOLIC BLOOD PRESSURE: 122 MMHG | BODY MASS INDEX: 26.98 KG/M2 | HEIGHT: 68 IN | HEART RATE: 66 BPM | DIASTOLIC BLOOD PRESSURE: 72 MMHG

## 2025-07-17 DIAGNOSIS — F41.8 DEPRESSION WITH ANXIETY: Primary | ICD-10-CM

## 2025-07-17 DIAGNOSIS — J45.990 ASTHMA, EXERCISE INDUCED: ICD-10-CM

## 2025-07-17 PROBLEM — G47.00 PERSISTENT INSOMNIA: Status: ACTIVE | Noted: 2025-07-17

## 2025-07-17 PROBLEM — H61.20 IMPACTED CERUMEN: Status: RESOLVED | Noted: 2025-07-17 | Resolved: 2025-07-17

## 2025-07-17 PROBLEM — G47.00 PERSISTENT INSOMNIA: Status: RESOLVED | Noted: 2025-07-17 | Resolved: 2025-07-17

## 2025-07-17 PROBLEM — R42 LIGHTHEADEDNESS: Status: RESOLVED | Noted: 2025-07-17 | Resolved: 2025-07-17

## 2025-07-17 PROBLEM — J36 ABSCESS OF TONSIL: Status: RESOLVED | Noted: 2025-07-17 | Resolved: 2025-07-17

## 2025-07-17 PROBLEM — H61.20 IMPACTED CERUMEN: Status: ACTIVE | Noted: 2025-07-17

## 2025-07-17 PROBLEM — G43.119 CLASSICAL MIGRAINE WITH INTRACTABLE MIGRAINE: Status: ACTIVE | Noted: 2025-07-17

## 2025-07-17 PROBLEM — J02.9 SORE THROAT: Status: RESOLVED | Noted: 2025-07-17 | Resolved: 2025-07-17

## 2025-07-17 PROBLEM — J02.9 SORE THROAT: Status: ACTIVE | Noted: 2025-07-17

## 2025-07-17 PROBLEM — R51.9 FRONTAL HEADACHE: Status: ACTIVE | Noted: 2025-07-17

## 2025-07-17 PROBLEM — R51.9 FRONTAL HEADACHE: Status: RESOLVED | Noted: 2025-07-17 | Resolved: 2025-07-17

## 2025-07-17 PROBLEM — Z00.00 ANNUAL PHYSICAL EXAM: Status: RESOLVED | Noted: 2023-08-28 | Resolved: 2025-07-17

## 2025-07-17 PROBLEM — F17.210 CIGARETTE SMOKER: Status: ACTIVE | Noted: 2025-07-17

## 2025-07-17 PROBLEM — J36 ABSCESS OF TONSIL: Status: ACTIVE | Noted: 2025-07-17

## 2025-07-17 PROBLEM — R42 LIGHTHEADEDNESS: Status: ACTIVE | Noted: 2025-07-17

## 2025-07-17 PROCEDURE — 3008F BODY MASS INDEX DOCD: CPT | Performed by: FAMILY MEDICINE

## 2025-07-17 PROCEDURE — 99213 OFFICE O/P EST LOW 20 MIN: CPT | Performed by: FAMILY MEDICINE

## 2025-07-17 RX ORDER — LEVALBUTEROL TARTRATE 45 UG/1
1-2 AEROSOL, METERED ORAL EVERY 4 HOURS PRN
Qty: 15 G | Refills: 5 | Status: SHIPPED | OUTPATIENT
Start: 2025-07-17

## 2025-07-17 RX ORDER — PAROXETINE 40 MG/1
40 TABLET, FILM COATED ORAL
Qty: 90 TABLET | Refills: 3 | Status: SHIPPED | OUTPATIENT
Start: 2025-07-17 | End: 2026-07-17

## 2025-07-17 NOTE — PROGRESS NOTES
"Chief Complaint  Patient ID: Adriel Moore \"Laron\" is a 34 y.o. male who presents for Anxiety and Depression.    Past Medical, Surgical, and Family History reviewed and updated in chart.    Reviewed all medications by prescribing practitioner or clinical pharmacist (such as prescriptions, OTCs, herbal therapies and supplements) and documented in the medical record.    History of Present Illness  1. Depression and Anxiety     - Laron is taking Paxil 40 mg daily, which he tolerates well.     - He is requesting a refill for Paxil. Missing a dose results in headaches and difficulty sleeping.    2. Exercise-Induced Asthma     - The condition is currently controlled.     - Laron is requesting a refill on Xopenex, which he uses only during the fall season while running. He reports feeling fantastic and has not experienced any breathing difficulties.    Review of Systems  All pertinent positive symptoms are included in the history of present illness.    All other systems have been reviewed and are negative and noncontributory to this patient's current ailments.    Social History  He reports that he has been smoking cigarettes. He has never been exposed to tobacco smoke. He has never used smokeless tobacco. He reports that he does not currently use alcohol after a past usage of about 10.0 standard drinks of alcohol per week. He reports that he does not currently use drugs after having used the following drugs: Marijuana and Psilocybin.    Allergies  Patient has no known allergies.    Objective   Visit Vitals  /72   Pulse 66   Ht 1.727 m (5' 8\")   Wt 80.7 kg (178 lb)   BMI 27.06 kg/m²   Smoking Status Every Day   BSA 1.97 m²        BP Readings from Last 3 Encounters:   07/17/25 122/72   11/12/24 132/84   04/15/24 126/70      Wt Readings from Last 3 Encounters:   07/17/25 80.7 kg (178 lb)   11/12/24 81.6 kg (180 lb)   04/15/24 79.4 kg (175 lb)      Physical Exam  CONSTITUTIONAL - well nourished, well developed, looks like " stated age, in no acute distress, not ill-appearing, and not tired appearing  SKIN - normal skin color and pigmentation, normal skin turgor without rash, lesions, or nodules visualized  HEAD - no trauma, normocephalic  EYES - pupils are equal and reactive to light, extraocular muscles are intact, and normal external exam  CHEST - clear to auscultation, no wheezing, no crackles and no rales, good effort  CARDIAC - regular rate and regular rhythm, no skipped beats, no murmur  EXTREMITIES - no obvious or evident edema, no obvious or evident deformities  NEUROLOGICAL - normal gait, normal balance, normal motor, no ataxia, alert, oriented and no focal signs  PSYCHIATRIC - alert, pleasant and cordial, age-appropriate    Assessment and Plan  Problem List Items Addressed This Visit       Asthma, exercise induced    Stable, no changes to medication recommended         Relevant Medications    levalbuterol (Xopenex HFA) 45 mcg/actuation inhaler    Depression with anxiety - Primary    Stable, no changes to medication recommended.  Refill sent to pharmacy         Relevant Medications    PARoxetine (Paxil) 40 mg tablet

## 2025-12-22 ENCOUNTER — APPOINTMENT (OUTPATIENT)
Dept: PRIMARY CARE | Facility: CLINIC | Age: 35
End: 2025-12-22
Payer: COMMERCIAL